# Patient Record
Sex: MALE | Race: WHITE | URBAN - METROPOLITAN AREA
[De-identification: names, ages, dates, MRNs, and addresses within clinical notes are randomized per-mention and may not be internally consistent; named-entity substitution may affect disease eponyms.]

---

## 2023-02-02 ENCOUNTER — OFFICE VISIT (OUTPATIENT)
Dept: URBAN - METROPOLITAN AREA CLINIC 30 | Facility: CLINIC | Age: 48
End: 2023-02-02
Payer: COMMERCIAL

## 2023-02-02 DIAGNOSIS — H16.012 CENTRAL CORNEAL ULCER OF LEFT EYE: Primary | ICD-10-CM

## 2023-02-02 DIAGNOSIS — H43.393 OTHER VITREOUS OPACITIES, BILATERAL: ICD-10-CM

## 2023-02-02 DIAGNOSIS — H18.833 RECURRENT CORNEAL EROSION OF CORNEA, BILATERAL: ICD-10-CM

## 2023-02-02 PROCEDURE — 99204 OFFICE O/P NEW MOD 45 MIN: CPT | Performed by: OPTOMETRIST

## 2023-02-02 PROCEDURE — 92134 CPTRZ OPH DX IMG PST SGM RTA: CPT | Performed by: OPTOMETRIST

## 2023-02-02 RX ORDER — BESIFLOXACIN 6 MG/ML
0.6 % SUSPENSION OPHTHALMIC
Qty: 5 | Refills: 1 | Status: ACTIVE
Start: 2023-02-02

## 2023-02-02 RX ORDER — SODIUM CHLORIDE 50 MG/G
5 % OINTMENT OPHTHALMIC
Qty: 1 | Refills: 0 | Status: INACTIVE
Start: 2023-02-02 | End: 2023-02-02

## 2023-02-02 RX ORDER — SODIUM CHLORIDE 50 MG/G
5 % OINTMENT OPHTHALMIC
Qty: 3.5 | Refills: 0 | Status: ACTIVE
Start: 2023-02-02

## 2023-02-02 ASSESSMENT — INTRAOCULAR PRESSURE
OS: 19
OD: 14

## 2023-02-02 NOTE — IMPRESSION/PLAN
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 Plan: See other hx. Will use Estelle yani qhs OD for now and OU once ulcer resolved OS. DEC to follow as well.

## 2023-02-02 NOTE — IMPRESSION/PLAN
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 Plan: Epidefect. Likely related to RCE based on prev hx. Multiple episodes in the past. Pain upon waking at night. May be infectious at this time d/t significant A/C rxn. Continue polytrim  q2h OS. Add besivanc q2h OS. Alternate gtts every q1hr. Start PF ATs QID OU. Applied 1 gtts of cyclopentolate OS for pain. RTC 1 day.

## 2023-02-03 ENCOUNTER — OFFICE VISIT (OUTPATIENT)
Dept: URBAN - METROPOLITAN AREA CLINIC 30 | Facility: CLINIC | Age: 48
End: 2023-02-03
Payer: COMMERCIAL

## 2023-02-03 PROCEDURE — 92285 EXTERNAL OCULAR PHOTOGRAPHY: CPT | Performed by: OPTOMETRIST

## 2023-02-03 PROCEDURE — 99213 OFFICE O/P EST LOW 20 MIN: CPT | Performed by: OPTOMETRIST

## 2023-02-03 RX ORDER — PREDNISOLONE ACETATE 10 MG/ML
1 % SUSPENSION/ DROPS OPHTHALMIC
Qty: 5 | Refills: 0 | Status: ACTIVE
Start: 2023-02-03

## 2023-02-03 NOTE — IMPRESSION/PLAN
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 Plan: Improving. Epidefect. Likely related to RCE based on prev hx. Multiple episodes in the past. Pain upon waking at night. May be infectious at this time d/t significant A/C rxn. Applied 1 gtts of cyclopentolate OS for pain in office today and yesterday. PLAN: Continue polytrim  q2h OS. Continue besivanc q2h OS. Alternate gtts every q1hr. Cont PF ATs QID OU. Start PA 1% QD OS. 

1/2/2023 [employer] Documentation 1/1/2023 [employer] Documentation

## 2023-02-03 NOTE — IMPRESSION/PLAN
Impression: Recurrent corneal erosion of cornea, bilateral: G54.075. Plan: See other hx. Will use Estelle yani qhs OD for now and OU once ulcer resolved OS. DEC to follow as well. Monitor.

## 2023-02-06 ENCOUNTER — OFFICE VISIT (OUTPATIENT)
Dept: URBAN - METROPOLITAN AREA CLINIC 30 | Facility: CLINIC | Age: 48
End: 2023-02-06
Payer: COMMERCIAL

## 2023-02-06 DIAGNOSIS — H18.833 RECURRENT CORNEAL EROSION OF CORNEA, BILATERAL: ICD-10-CM

## 2023-02-06 PROCEDURE — 99213 OFFICE O/P EST LOW 20 MIN: CPT | Performed by: OPTOMETRIST

## 2023-02-06 NOTE — IMPRESSION/PLAN
Impression: Recurrent corneal erosion of cornea, bilateral: F91.837. Plan: See other hx. Estelle yani qhs OU. DEC to follow as well. Continue to monitor.

## 2023-02-06 NOTE — IMPRESSION/PLAN
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 Plan: Ulcer/defect resolved. Secondary to RCE based on prev hx. Multiple episodes in the past. Some mild RCE noted centrally today. Loose epith removed. Pain upon waking at night- improving. Pt also on augmentin, will hold off on Doxy for now. PLAN: Continue polytrim QID OS and  Besivance QID OS. Cont PF ATs QID OU. Continue PA 1% QD OS- but increase to BID OS. Start Estelle 5% unq qhs OS. 

1/2/2023 [employer] Documentation 1/1/2023 [employer] Documentation

## 2023-02-10 ENCOUNTER — OFFICE VISIT (OUTPATIENT)
Dept: URBAN - METROPOLITAN AREA CLINIC 30 | Facility: CLINIC | Age: 48
End: 2023-02-10
Payer: COMMERCIAL

## 2023-02-10 DIAGNOSIS — H16.012 CENTRAL CORNEAL ULCER OF LEFT EYE: Primary | ICD-10-CM

## 2023-02-10 PROCEDURE — 99213 OFFICE O/P EST LOW 20 MIN: CPT | Performed by: OPTOMETRIST

## 2023-02-10 NOTE — IMPRESSION/PLAN
Impression: Recurrent corneal erosion of cornea, bilateral: Q93.189. Plan: See other hx. Estelle yani qhs OU. DEC to follow as well. CTM.

## 2023-02-23 ENCOUNTER — OFFICE VISIT (OUTPATIENT)
Dept: URBAN - METROPOLITAN AREA CLINIC 30 | Facility: CLINIC | Age: 48
End: 2023-02-23
Payer: COMMERCIAL

## 2023-02-23 DIAGNOSIS — H52.4 PRESBYOPIA: ICD-10-CM

## 2023-02-23 DIAGNOSIS — H18.833 RECURRENT CORNEAL EROSION OF CORNEA, BILATERAL: ICD-10-CM

## 2023-02-23 DIAGNOSIS — H16.012 CENTRAL CORNEAL ULCER OF LEFT EYE: Primary | ICD-10-CM

## 2023-02-23 PROCEDURE — 99213 OFFICE O/P EST LOW 20 MIN: CPT | Performed by: OPTOMETRIST

## 2023-02-23 NOTE — IMPRESSION/PLAN
Impression: Recurrent corneal erosion of cornea, bilateral: A16.854. Plan: See other hx. Estelle yani qhs OU. Consider Doxy in the future, if recurs. DEC to follow soon as well. Continue to monitor.

## 2023-02-23 NOTE — IMPRESSION/PLAN
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 Plan: Ulcer/defect resolved. Mild k scar  Secondary to RCE based on prev hx. Multiple episodes in the past.  Pain upon waking at night- improving. Cont  Estelle 5% unq qhs OS. PA 1% qd OS- may d/c 

1/2/2023 [employer] Documentation 1/1/2023 [employer] Documentation

## 2023-02-23 NOTE — IMPRESSION/PLAN
Impression: Presbyopia: H52.4. Plan: Discussed [employer] c pt. Pt is aware it is cash pay only. Pt defers for now.